# Patient Record
Sex: MALE | Race: WHITE
[De-identification: names, ages, dates, MRNs, and addresses within clinical notes are randomized per-mention and may not be internally consistent; named-entity substitution may affect disease eponyms.]

---

## 2017-06-19 ENCOUNTER — HOSPITAL ENCOUNTER (OUTPATIENT)
Dept: HOSPITAL 47 - LABWHC1 | Age: 77
Discharge: HOME | End: 2017-06-19
Attending: INTERNAL MEDICINE
Payer: MEDICARE

## 2017-06-19 DIAGNOSIS — E11.9: ICD-10-CM

## 2017-06-19 DIAGNOSIS — I10: ICD-10-CM

## 2017-06-19 DIAGNOSIS — E78.00: Primary | ICD-10-CM

## 2017-06-19 LAB
ANION GAP SERPL CALC-SCNC: 13 MMOL/L
AST SERPL-CCNC: 29 U/L (ref 17–59)
BUN SERPL-SCNC: 29 MG/DL (ref 9–20)
CH: 30.4
CHCM: 33.3
CHLORIDE SERPL-SCNC: 102 MMOL/L (ref 98–107)
CHOLEST SERPL-MCNC: 130 MG/DL (ref ?–200)
CO2 SERPL-SCNC: 25 MMOL/L (ref 22–30)
ERYTHROCYTE [DISTWIDTH] IN BLOOD BY AUTOMATED COUNT: 4.77 M/UL (ref 4.3–5.9)
ERYTHROCYTE [DISTWIDTH] IN BLOOD: 13.6 % (ref 11.5–15.5)
GLUCOSE SERPL-MCNC: 108 MG/DL (ref 74–99)
HCT VFR BLD AUTO: 43.8 % (ref 39–53)
HDLC SERPL-MCNC: 35 MG/DL (ref 40–60)
HDW: 2.25
HEMOGLOBIN A1C: 5.9 % (ref 4.2–6.1)
HGB BLD-MCNC: 14.5 GM/DL (ref 13–17.5)
MCH RBC QN AUTO: 30.4 PG (ref 25–35)
MCHC RBC AUTO-ENTMCNC: 33.2 G/DL (ref 31–37)
MCV RBC AUTO: 91.7 FL (ref 80–100)
NON-AFRICAN AMERICAN GFR(MDRD): >60
POTASSIUM SERPL-SCNC: 4.3 MMOL/L (ref 3.5–5.1)
SODIUM SERPL-SCNC: 140 MMOL/L (ref 137–145)
TRIGL SERPL-MCNC: 74 MG/DL (ref ?–150)
WBC # BLD AUTO: 5.3 K/UL (ref 3.8–10.6)

## 2017-06-19 PROCEDURE — 80061 LIPID PANEL: CPT

## 2017-06-19 PROCEDURE — 83036 HEMOGLOBIN GLYCOSYLATED A1C: CPT

## 2017-06-19 PROCEDURE — 80051 ELECTROLYTE PANEL: CPT

## 2017-06-19 PROCEDURE — 82565 ASSAY OF CREATININE: CPT

## 2017-06-19 PROCEDURE — 82947 ASSAY GLUCOSE BLOOD QUANT: CPT

## 2017-06-19 PROCEDURE — 84520 ASSAY OF UREA NITROGEN: CPT

## 2017-06-19 PROCEDURE — 85027 COMPLETE CBC AUTOMATED: CPT

## 2017-06-19 PROCEDURE — 84450 TRANSFERASE (AST) (SGOT): CPT

## 2017-06-19 PROCEDURE — 36415 COLL VENOUS BLD VENIPUNCTURE: CPT

## 2017-07-14 ENCOUNTER — HOSPITAL ENCOUNTER (OUTPATIENT)
Dept: HOSPITAL 47 - RADCTMAIN | Age: 77
Discharge: HOME | End: 2017-07-14
Payer: MEDICARE

## 2017-07-14 DIAGNOSIS — C91.40: Primary | ICD-10-CM

## 2017-07-14 DIAGNOSIS — Z98.890: ICD-10-CM

## 2017-07-14 DIAGNOSIS — Z88.5: ICD-10-CM

## 2017-07-14 LAB
BUN SERPL-SCNC: 25 MG/DL (ref 9–20)
NON-AFRICAN AMERICAN GFR(MDRD): >60

## 2017-07-14 PROCEDURE — 71260 CT THORAX DX C+: CPT

## 2017-07-14 PROCEDURE — 82565 ASSAY OF CREATININE: CPT

## 2017-07-14 PROCEDURE — 36415 COLL VENOUS BLD VENIPUNCTURE: CPT

## 2017-07-14 PROCEDURE — 84520 ASSAY OF UREA NITROGEN: CPT

## 2017-07-14 PROCEDURE — 70491 CT SOFT TISSUE NECK W/DYE: CPT

## 2017-07-17 NOTE — CT
EXAMINATION TYPE: CT neck chest w con

 

DATE OF EXAM: 7/14/2017

 

COMPARISON: CT neck June 2, 2015. CT chest June 12, 2012. PET CT October 13, 2012.

 

HISTORY: History of skin cancer and hairy cell leukemia.

 

CT DLP: 1102.00 mGycm.   Automated Exposure Control for Dose Reduction was Utilized.

 

 

TECHNIQUE:  CT scan of the neck and thorax are performed following with IV Contrast, patient injected
 with 100 mL of Omnipaque 300.

 

FINDINGS:

 

NECK:

 

 

Airway: There is heterogeneous multinodular thyroid redemonstrated, there appears to be interval more
 numerous and prominent nodules, for reference 1 cm nodule is seen on coronal image 57. Further inves
tigation with ultrasound correlation is advised. Majority of left thyroid is surgically absent simila
r prior.

 

Parotid/submandibular glands: Left-sided carotid and submandibular glands are surgically absent.

 

Carotid/Vascular Structures: Surrounding soft tissue along course of the left common carotid artery i
ncluding bulb and proximal internal carotid artery likely reflects product of treatment change.

 

Osseous Structures: There is prominent multilevel spurring in the cervical thoracic spine. Multilevel
 moderate disc space narrowing is seen most prominent C5-C6 and T2-T3 levels. 

 

Other: Majority of left neck is surgically resected as detailed above including left-sided muscles fr
om parotid level posterior to the mandible down to the level of thyroid.

 

No new suspicious greater than 1 cm lymph nodes are seen.

 

CHEST:

 

LUNGS: Left apical scarring is redemonstrated at site of surgical suture. There is additional more pa
tchy scarring in the right middle lobe near diaphragm. There is elevated right hemidiaphragm redemons
trated. No suspicious nodules or masses are present. No pleural effusion or pneumothorax is seen. Tra
cheobronchial tree is patent.

 

MEDIASTINUM: There are no greater than 1 cm hilar or mediastinal lymph nodes.   No cardiomegaly or pe
ricardial effusion is seen.  Coronary artery calcification is present.

 

OTHER: Cholecystectomy clips are seen. Multilevel spurring in the spine is present.

 

IMPRESSION: Long segment postsurgical changes left neck with smaller changes left lung apex. No recur
rent mass or adenopathy is seen to suggest neoplastic recurrence.

## 2018-08-01 ENCOUNTER — HOSPITAL ENCOUNTER (OUTPATIENT)
Dept: HOSPITAL 47 - RADECHMAIN | Age: 78
Discharge: HOME | End: 2018-08-01
Attending: INTERNAL MEDICINE
Payer: MEDICARE

## 2018-08-01 DIAGNOSIS — I49.3: Primary | ICD-10-CM

## 2018-08-01 DIAGNOSIS — I47.1: ICD-10-CM

## 2018-08-01 PROCEDURE — 93225 XTRNL ECG REC<48 HRS REC: CPT

## 2018-08-01 PROCEDURE — 93226 XTRNL ECG REC<48 HR SCAN A/R: CPT

## 2018-08-07 NOTE — HM
HOLTER MONITOR REPORT



INDICATION:

Chest discomfort.



REFERRING PHYSICIAN:

Dr. Aleman.



CLINICAL INFORMATION:

The patient was monitored for 24 hours.  The baseline rhythm appeared to be a sinus

mechanism with a minimum heart rate of 51 beats per minute, max heart rate 112 beats

per minute and average heart rate of 68 beats per minute.  Ventricular ectopic events

were represented in less than 1% of the total beats count and presented as PVC,

bigeminy, triplet and couplet as well.  Supraventricular ectopic events were present in

less than 1% of the total beats count and presented as couplets and triplets as well as

PACs.  The patient did have multiple episodes of paroxysmal atrial tachycardia noted.

No evidence of any sinus pause or sinus arrest.  No evidence of any advanced AV block

seen.  The patient reported symptoms of fluttering in the chest and the symptoms were

associated with normal sinus mechanism as well as with PVCs.



CONCLUSION:

1. Sinus rhythm as a baseline mechanism.

2. Prior ventricular ectopic events.

3. Rare supraventricular ectopic events.

4. The patient did have multiple episodes of paroxysmal atrial tachycardia noted.

5. No evidence of any sinus pause or sinus arrest.

6. There is no evidence of any advanced atrioventricular block.

7. The patient reports symptoms of palpitations in the chest and fluttering in the

    chest and the symptoms were associated with normal sinus mechanism and premature

    ventricular contraction.





MMODL / IJN: [id number] / Job#: [id number]

## 2018-08-13 ENCOUNTER — HOSPITAL ENCOUNTER (OUTPATIENT)
Dept: HOSPITAL 47 - ORWHC2ENDO | Age: 78
Discharge: HOME | End: 2018-08-13
Payer: MEDICARE

## 2018-08-13 VITALS — SYSTOLIC BLOOD PRESSURE: 155 MMHG | RESPIRATION RATE: 18 BRPM | HEART RATE: 68 BPM | DIASTOLIC BLOOD PRESSURE: 73 MMHG

## 2018-08-13 VITALS — BODY MASS INDEX: 54.9 KG/M2

## 2018-08-13 VITALS — TEMPERATURE: 98.6 F

## 2018-08-13 DIAGNOSIS — Z79.84: ICD-10-CM

## 2018-08-13 DIAGNOSIS — E78.5: ICD-10-CM

## 2018-08-13 DIAGNOSIS — K64.8: ICD-10-CM

## 2018-08-13 DIAGNOSIS — K21.9: ICD-10-CM

## 2018-08-13 DIAGNOSIS — Z86.73: ICD-10-CM

## 2018-08-13 DIAGNOSIS — Z79.899: ICD-10-CM

## 2018-08-13 DIAGNOSIS — Z12.11: Primary | ICD-10-CM

## 2018-08-13 DIAGNOSIS — E11.9: ICD-10-CM

## 2018-08-13 DIAGNOSIS — G45.3: ICD-10-CM

## 2018-08-13 DIAGNOSIS — K57.30: ICD-10-CM

## 2018-08-13 DIAGNOSIS — Z87.891: ICD-10-CM

## 2018-08-13 DIAGNOSIS — Z88.5: ICD-10-CM

## 2018-08-13 DIAGNOSIS — Z86.010: ICD-10-CM

## 2018-08-13 DIAGNOSIS — Z79.02: ICD-10-CM

## 2018-08-13 DIAGNOSIS — I10: ICD-10-CM

## 2018-08-13 LAB — GLUCOSE BLD-MCNC: 88 MG/DL (ref 75–99)

## 2018-08-13 NOTE — P.PCN
Date of Procedure: 08/13/18


Procedure(s) Performed: 


Procedure: Total colonoscopy.





Preoperative diagnosis: Screening for neoplasia.





Postoperative diagnosis: Sigmoid diverticulosis with no evidence of acute 

diverticulitis, strictures, polyps or cancer.





Preparation: HalfLytely prep.





Sedation: Was provided by anesthesia.





Brief clinical history: The patient is a 78-year-old male who is scheduled for 

this evaluation for screening for neoplasia.  The patient had a prior exam more 

than 5 years ago and he indicated history of polyps.  He has no abdominal 

complaints, bleeding or anemia.





Procedure: With the patient on his left lateral decubitus position and after 

informed consent and adequate sedation, the perianal area was inspected and it 

did not show any fissures or fistulas.  There were no masses felt on digital 

rectal examination.  The Olympus CFQ 160L video colonoscope was then inserted 

in the rectum in the usual fashion and advanced to the cecum.  There were 

multiple diverticular orifices seen scattered in the sigmoid but I saw no 

evidence of acute diverticulitis or strictures.  No polyps or tumors were seen.

  I retroflexed the endoscope in the rectum before the endoscope was withdrawn.

  Low-grade internal hemorrhoids were noted but there was no evidence of 

bleeding.





The patient tolerated the procedure well.





Plan: The patient was reassured.  Discussed dietary measures.  He will follow 

up with you as planned.  Further plans can be made based on his course.

## 2020-06-05 ENCOUNTER — HOSPITAL ENCOUNTER (OUTPATIENT)
Dept: HOSPITAL 47 - LABWHC1 | Age: 80
Discharge: HOME | End: 2020-06-05
Attending: INTERNAL MEDICINE
Payer: MEDICARE

## 2020-06-05 DIAGNOSIS — I10: Primary | ICD-10-CM

## 2020-06-05 DIAGNOSIS — E11.9: ICD-10-CM

## 2020-06-05 DIAGNOSIS — E78.3: ICD-10-CM

## 2020-06-05 LAB
ALBUMIN SERPL-MCNC: 4.4 G/DL (ref 3.8–4.9)
ALBUMIN/GLOB SERPL: 2.59 G/DL (ref 1.6–3.17)
ALP SERPL-CCNC: 107 U/L (ref 41–126)
ALT SERPL-CCNC: 24 U/L (ref 10–49)
ANION GAP SERPL CALC-SCNC: 7.8 MMOL/L (ref 4–12)
AST SERPL-CCNC: 22 U/L (ref 14–35)
BASOPHILS # BLD AUTO: 0 K/UL (ref 0–0.2)
BASOPHILS NFR BLD AUTO: 0 %
BUN SERPL-SCNC: 28 MG/DL (ref 9–27)
BUN/CREAT SERPL: 28 RATIO (ref 12–20)
CALCIUM SPEC-MCNC: 9.1 MG/DL (ref 8.7–10.3)
CHLORIDE SERPL-SCNC: 103 MMOL/L (ref 96–109)
CHOLEST SERPL-MCNC: 126 MG/DL (ref 0–200)
CO2 SERPL-SCNC: 28.2 MMOL/L (ref 21.6–31.8)
EOSINOPHIL # BLD AUTO: 0.2 K/UL (ref 0–0.7)
EOSINOPHIL NFR BLD AUTO: 3 %
ERYTHROCYTE [DISTWIDTH] IN BLOOD BY AUTOMATED COUNT: 4.53 M/UL (ref 4.3–5.9)
ERYTHROCYTE [DISTWIDTH] IN BLOOD: 12.9 % (ref 11.5–15.5)
GLOBULIN SER CALC-MCNC: 1.7 G/DL (ref 1.6–3.3)
GLUCOSE SERPL-MCNC: 109 MG/DL (ref 70–110)
HBA1C MFR BLD: 6.4 % (ref 4–6)
HCT VFR BLD AUTO: 41.1 % (ref 39–53)
HDLC SERPL-MCNC: 31 MG/DL (ref 40–60)
HGB BLD-MCNC: 13.6 GM/DL (ref 13–17.5)
LDLC SERPL CALC-MCNC: 77.6 MG/DL (ref 0–131)
LYMPHOCYTES # SPEC AUTO: 0.9 K/UL (ref 1–4.8)
LYMPHOCYTES NFR SPEC AUTO: 15 %
MCH RBC QN AUTO: 29.9 PG (ref 25–35)
MCHC RBC AUTO-ENTMCNC: 33 G/DL (ref 31–37)
MCV RBC AUTO: 90.8 FL (ref 80–100)
MONOCYTES # BLD AUTO: 0.4 K/UL (ref 0–1)
MONOCYTES NFR BLD AUTO: 6 %
NEUTROPHILS # BLD AUTO: 4.5 K/UL (ref 1.3–7.7)
NEUTROPHILS NFR BLD AUTO: 74 %
PLATELET # BLD AUTO: 141 K/UL (ref 150–450)
POTASSIUM SERPL-SCNC: 4.6 MMOL/L (ref 3.5–5.5)
PROT SERPL-MCNC: 6.1 G/DL (ref 6.2–8.2)
SODIUM SERPL-SCNC: 139 MMOL/L (ref 135–145)
TRIGL SERPL-MCNC: 87 MG/DL (ref 0–149)
VLDLC SERPL CALC-MCNC: 17.4 MG/DL (ref 5–40)
WBC # BLD AUTO: 6.1 K/UL (ref 3.8–10.6)

## 2020-06-05 PROCEDURE — 84443 ASSAY THYROID STIM HORMONE: CPT

## 2020-06-05 PROCEDURE — 85025 COMPLETE CBC W/AUTO DIFF WBC: CPT

## 2020-06-05 PROCEDURE — 80061 LIPID PANEL: CPT

## 2020-06-05 PROCEDURE — 83036 HEMOGLOBIN GLYCOSYLATED A1C: CPT

## 2020-06-05 PROCEDURE — 80053 COMPREHEN METABOLIC PANEL: CPT

## 2020-06-05 PROCEDURE — 36415 COLL VENOUS BLD VENIPUNCTURE: CPT

## 2024-09-05 NOTE — XR
EXAM TYPE: LUMBAR SPINE X RAY SERIES

 

COMPARISON: NONE

 

HISTORY: Pain

 

TECHNIQUE: 3 views are submitted.

 

FINDINGS:

Multilevel hypertrophic and degenerative changes spine with multilevel facet arthropathy. Vascular ca
lcifications. Generalized demineralization. Sclerotic density right iliac bone likely related to bone
 island. Surgical clips incidentally noted. Nonspecific sclerosis L2 vertebral body.

 

IMPRESSION:

1. Multilevel degenerative disc disease most marked at L5-S1. Severe facet arthropathy L3-S1 foramina
l encroachment L4-5 and L5-S1 suspected.

2. Nonspecific sclerosis involving the L2 vertebral body. Correlate with PSA.

## 2024-09-05 NOTE — XR
EXAMINATION TYPE: XR Hip RT and AP Pelvis

 

DATE OF EXAM: 9/5/2024

 

COMPARISON: NONE

 

HISTORY: A

 

TECHNIQUE: A single AP view of the pelvis is obtained. Two views of the right hip are obtained.  

 

FINDINGS: Degenerative changes in the spine. Vascular calcifications. Mild generalized demineralizati
on. Nonspecific sclerotic lesion right iliac bone possibly bone island. Moderate hypertrophic bilater
al hip arthropathy. Correlate for femoral acetabular impingement. No definite acute fracture or dislo
cation. SI joint arthropathy.

 

IMPRESSION:

1. 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

2. No definite acute fracture. If there is difficulty with weightbearing and history of trauma then c
onsider CT scan.

## 2024-09-05 NOTE — ED
Back Pain HPI





- General


Chief Complaint: Back Pain/Injury


Stated Complaint: R hip pain


Time Seen by Provider: 09/05/24 08:30


Source: patient, RN notes reviewed


Limitations: no limitations





- History of Present Illness


Initial Comments: 


84-year-old male presents to the emergency department with lower back pain 

radiating to right hip and leg for 1 week, previous history of lumbar spinal 

surgery, denies associated symptoms.  Patient states that he has pain in his low

back to right hip denies any bowel, bladder and cons retention no saddle 

anesthesias no abdominal complaints.  He was seen in urgent care over a week 

ago.  He states that the burning type pain.  He is only taken acetaminophen for 

this pain.  Reports no ameliorating factors, walking aggravates pain.


MD Complaint: back pain


-: week(s)


Similar Symptoms Previously: Yes


Place: home


Radiation: right leg


Severity: severe


Severity scale (1-10): 10


Quality: burning


Consistency: constant


Improves With: none


Worsens With: walking


Context: unknown


Associated Symptoms: denies other symptoms


Treatments Prior to Arrival: NSAIDS, acetaminophen





- Related Data


                                Home Medications











 Medication  Instructions  Recorded  Confirmed


 


Aleve Arthritis 1 each PO BID 08/08/18 08/13/18


 


Clopidogrel [Plavix] 75 mg PO DAILY 08/08/18 08/13/18


 


Focus Eye Tabs 1 each PO DAILY 08/08/18 08/13/18


 


Omeprazole 20 mg PO DAILY 08/08/18 08/13/18


 


Pravastatin Sodium [Pravachol] 40 mg PO HS 08/08/18 08/13/18


 


amLODIPine BESYLATE/BENAZEPRIL 1 each PO DAILY 08/08/18 08/13/18





[amLODIPine BESYLATE/BENAZEPRIL   





5-10 mg]   


 


metFORMIN HCL [Glucophage] 500 mg PO TID 08/08/18 08/13/18








                                  Previous Rx's











 Medication  Instructions  Recorded


 


Acetaminophen-Codeine 300-30mg 1 tab PO Q4H PRN #12 tablet 09/05/24





[Tylenol #3]  


 


predniSONE 50 mg PO DAILY #5 tab 09/05/24











                                    Allergies











Allergy/AdvReac Type Severity Reaction Status Date / Time


 


fentanyl Allergy  Itching Verified 09/05/24 08:27





   AND HIVES  














Review of Systems


ROS Statement: 


Those systems with pertinent positive or pertinent negative responses have been 

documented in the HPI.





ROS Other: All systems not noted in ROS Statement are negative.


Musculoskeletal: Reports: back pain, arthralgia





Past Medical History


Past Medical History: Cancer, CVA/TIA, Diabetes Mellitus, GERD/Reflux, Hearing 

Disorder / Deafness, Hyperlipidemia, Hypertension


Additional Past Medical History / Comment(s): STROKE BEHIND EYES PER PT-WAS 

BLIND FOR A PERIOD OF TIME. VISION OK AT THIS TIME.  SKIN CANCER


History of Any Multi-Drug Resistant Organisms: None Reported


Past Surgical History: Cholecystectomy, Orthopedic Surgery


Additional Past Surgical History / Comment(s): BONE SPURS REMOVED BILAT 

SHOULDERS.  COLONOSCOPY.  BILAT CATARACTS.  30 LYMPH NODES REMOVED FROM LEFT 

SIDE OF BODY, LT SIDED SALIVARY GLANDS, LT JUGULAR VEIN AND NERVE ENDINGS TO 

LEFT SIDE OF FACE REMOVED R/T CANCER


Past Anesthesia/Blood Transfusion Reactions: No Reported Reaction


Past Psychological History: No Psychological Hx Reported


Past Alcohol Use History: None Reported


Past Drug Use History: None Reported





- Past Family History


  ** Brother(s)


Family Medical History: Cancer


Additional Family Medical History / Comment(s): COLON





General Exam


Limitations: no limitations


General appearance: alert, in no apparent distress


Head exam: Present: atraumatic, normocephalic, normal inspection


Eye exam: Present: normal appearance, PERRL, EOMI.  Absent: scleral icterus, 

conjunctival injection, periorbital swelling


ENT exam: Present: normal exam, mucous membranes moist


Neck exam: Present: other.  Absent: normal inspection (Chronic Deformity), 

tenderness, meningismus, lymphadenopathy


Respiratory exam: Present: normal lung sounds bilaterally.  Absent: respiratory 

distress, wheezes, rales, rhonchi, stridor


Cardiovascular Exam: Present: regular rate, normal rhythm, normal heart sounds. 

Absent: systolic murmur, diastolic murmur, rubs, gallop, clicks


GI/Abdominal exam: Present: soft, normal bowel sounds.  Absent: distended, 

tenderness, guarding, rebound, rigid


 exam: Present: normal inspection


Extremities exam: Present: full ROM, tenderness


  ** Right


Hip exam: Present: normal inspection, full ROM, tenderness


Upper Leg exam: Present: normal inspection, full ROM, tenderness


Back exam: Present: full ROM, tenderness, vertebral tenderness


Neurological exam: Present: alert, oriented X3, CN II-XII intact





Course


                                   Vital Signs











  09/05/24 09/05/24





  08:24 09:38


 


Temperature 97.7 F 98.1 F


 


Pulse Rate 55 L 54 L


 


Respiratory 16 18





Rate  


 


Blood Pressure 197/73 180/74


 


O2 Sat by Pulse 99 97





Oximetry  














Medical Decision Making





- Medical Decision Making


Was pt. sent in by a medical professional or institution (NATHALY Calvo, NP, urgent 

care, hospital, or nursing home...) When possible be specific


@ -No


Did you speak to anyone other than the patient for history (EMS, parent, family,

police, friend...)? What history was obtained from this source 


@ -No


Did you review nursing and triage notes (agree or disagree)? Why? 


@ -I reviewed and agree with nursing and triage notes


Were old charts reviewed (outside hosp., previous admission, EMS record, old 

EKG, old radiological studies, urgent care reports/EKG's, nursing home records)?

Report findings 


@ -No old charts were reviewed


Differential Diagnosis (chest pain, altered mental status, abdominal pain women,

abdominal pain men, vaginal bleeding, weakness, fever, dyspnea, syncope, 

headache, dizziness, GI bleed, back pain, seizure, CVA, palpatations, mental 

health, musculoskeletal)? 


@ -Differential Back Pain:


Strain, zoster, cauda equina syndrome, epidural abscess, vertebral 

osteomyelitis, discitis, fracture, subluxation, disc herniation, DJD, spinal 

stenosis, dissection, AAA, pancreatitis, peptic ulcer disease, pyelonephritis, 

kidney stone, this is not meant to be an all-inclusive list.





EKG interpreted by me (3pts min.).


@ -None


X-rays interpreted by me (1pt min.).


@ -@.  Lumbar spine showing degenerative changes lower lumbar no osseous 

abnormality otherwise


X-ray right hip and AP pelvis showing acetabular impingement syndrome, 

degenerative changes


CT interpreted by me (1pt min.).


@ -None done


U/S interpreted by me (1pt. min.).


@ -None done


What testing was considered but not performed or refused? (CT, X-rays, U/S, 

labs)? Why?


@ -None


What meds were considered but not given or refused? Why?


@ -None


Did you discuss the management of the patient with other professionals 

(professionals i.e. NATHALY Calvo, NP, lab, RT, psych nurse, , , 

teacher, , )? Give summary


@ -No


Was smoking cessation discussed for >3mins.?


@ -No


Was critical care preformed (if so, how long)?


@ -No


Were there social determinants of health that impacted care today? How? 

(Homelessness, low income, unemployed, alcoholism, drug addiction, 

transportation, low edu. Level, literacy, decrease access to med. care, long term, 

rehab)?


@ -No


Was there de-escalation of care discussed even if they declined (Discuss DNR or 

withdrawal of care, Hospice)? DNR status


@ -No


What co-morbidities impacted this encounter? (DM, HTN, Smoking, COPD, CAD, 

Cancer, CVA, ARF, Chemo, Hep., AIDS, mental health diagnosis, sleep apnea, 

morbid obesity)?


@ -None


Was patient admitted / discharged? Hospital course, mention meds given and 

route, prescriptions, significant lab abnormalities, going to OR and other 

pertinent info.


@ -Discharge patient presented for hip pain, low back pain patient has no red 

flag symptoms.  He has degenerative changes noted on x-ray he will follow-up 

with orthopedics.  Patient provided analgesics, steroids.  Return transfer 

discussed.


Undiagnosed new problem with uncertain prognosis?


@ -No


Drug Therapy requiring intensive monitoring for toxicity (Heparin, Nitro, 

Insulin, Cardizem)?


@ -No


Were any procedures done?


@ -No


Diagnosis/symptom?


@ -Lumbar radiculopathy, right hip pain


Acute, or Chronic, or Acute on Chronic?


@ -Acute


Uncomplicated (without systemic symptoms) or Complicated (systemic symptoms)?


@ -Uncomplicated


Side effects of treatment?


@ -No


Exacerbation, Progression, or Severe Exacerbation?


@ -No


Poses a threat to life or bodily function? How? (Chest pain, USA, MI, pneumonia,

PE, COPD, DKA, ARF, appy, cholecystitis, CVA, Diverticulitis, Homicidal, 

Suicidal, threat to staff... and all critical care pts)


@ -No








Disposition


Clinical Impression: 


 Lumbar radiculopathy, acute, Right hip pain





Disposition: HOME SELF-CARE


Condition: Stable


Instructions (If sedation given, give patient instructions):  Lumbar 

Radiculopathy (ED)


Additional Instructions: 


Please return to the Emergency Department if symptoms worsen or any other 

concerns.


Prescriptions: 


predniSONE 50 mg PO DAILY #5 tab


Acetaminophen-Codeine 300-30mg [Tylenol #3] 1 tab PO Q4H PRN #12 tablet


 PRN Reason: pain


Is patient prescribed a controlled substance at d/c from ED?: Yes


When asked, does pt state using other controlled substances?: No


If prescribed controlled substance>3 days was MAPS reviewed?: Prescribed <3 Days


If opioid is for acute pain is fill amount 7 days or less?: Yes


If Rx opioid, was Start Talking consent form obtained?: Yes


Referrals: 


Enoc Weaver MD [Primary Care Provider] - 1-2 days


RORO Mckinley DO [Doctor of Osteopathic Medicine] - 1-2 days


Time of Disposition: 10:21

## 2024-09-20 NOTE — US
EXAMINATION TYPE: US carotid duplex BILAT

 

DATE OF EXAM: 9/20/2024

 

COMPARISON: NONE

 

CLINICAL INDICATION: Male, 84 years old with history of I65.23 CAROTID STENOSIS; stenosis

 

TECHNIQUE: Carotid duplex ultrasound examination. Indirect Doppler criteria was utilized.

 

FINDINGS:

 

EXAM MEASUREMENTS: 

 

RIGHT:  Peak Systolic Velocity (PSV) cm/sec

----- Right CCA:  108  

----- Right ICA:  142     

----- Right ECA:  77.2   

ICA/CCA ratio:  1.3    

 

RIGHT:  End Diastole cm/sec

----- Right CCA:  16   

----- Right ICA:  21.7      

----- Right ECA:  1.5     

 

LEFT:  Peak Systolic Velocity (PSV) cm/sec

----- Left CCA:  88.6  

----- Left ICA:  82.8   

----- Left ECA:  63.8  

ICA/CCA ratio:  0.9  

 

LEFT:  End Diastole cm/sec

----- Left CCA:  17.4  

----- Left ICA:  14.9   

----- Left ECA:  6.6 

 

VERTEBRALS (direction of flow):

Right Vertebral: Antegrade

Left Vertebral: Antegrade

 

Rhythm:  Normal

 

SONOGRAPHER NOTES: exam limited due to tortuous vessels and very high bifurcation with shadowing from
 mandible, significant soft plaque noted throughout. Proximal ICA has an elevated velocity of 142 tho
ugh does not display a stenotic waveform

 

 

 

IMPRESSION:  

Atheromatous plaquing bilateral carotid by patient's. This is moderate stenosis between 50 and 69%, r
ight internal carotid artery.   

 

 

 

 

 

 

Criteria for Assigning % of Stenosis / Diameter reduction

(Estimation based on the indirect measurements of the internal carotid artery velocities (ICA PSV).

1.  Normal (no stenosis)=ICA PSV < 125 cm/s: ratio < 2.0: ICA EDV<40 cm/s.

2. Less than 50% stenosis=ICA PSV < 125 cm/s: ratio < 2.0: ICA EDV<40 cm/s.

3.  50 to 69% stenosis=ICA PSV of 125 to 230 cm/s: ration 2.0 ? 4.0: ICA EDV  cm/s.

4.  Greater than 70% stenosis to near occlusion= ICA PSV > 230 cm/s: ratio > 4.0: ICA EDV > 100 cm/s.
 

5.  Near occlusion= ICA PSV velocities may be low or undetectable: variable ratio and ICA EDV.

6.  Total occlusion=unable to detect flow.

 

 

 

 

 

[facility], Workstation: [id number], 9/20/2024 3:51 PM

## 2024-09-30 NOTE — CT
EXAMINATION TYPE: 

CT noncontrast brain.

CT angiogram brain.

CT angiogram neck.

 

CT DLP: 1326.7 mGycm, Automated exposure control for dose reduction was used.

 

DATE OF EXAM: 9/30/2024 4:47 PM

 

COMPARISON: 7/14/2017.  

 

CLINICAL INDICATION: Male, 84 years old with history of I65.23 OCCLUSION AND STENOSIS OF BILATERAL CA
ROTID; PHH, Carotid stenosis.

 

TECHNIQUE: 

Noncontrast CT brain was performed followed by CT angiogram head and neck. Axially acquired helical C
T angiogram of the head and neck was obtained with contrast. Axial images are supplemented with 3D re
constructions  and MIP images which were post-processed at an independent workstation. NASCET criteri
a used.

Contrast used:65 ml mL of Isovue 370 with IV Contrast, 

Oral contrast used: None. 

 

FINDINGS:

Brain:

Extra-axial spaces: No abnormal extra-axial fluid collections.

Ventricular system: Dilatation in proportion to cerebral atrophy.

Cerebral parenchyma: Cerebral atrophy. No acute intraparenchymal hemorrhage or mass effect.  The gray
-white junction is well differentiated. Scattered hypoattenuating areas are seen within the white mat
ter.  

Cerebellum: Unremarkable.

Mass effect: No evidence of midline shift.

Intracranial vasculature: Atherosclerotic calcifications of the intracranial vessels.

Soft tissues: Normal.

Calvarium/osseous structures: No depressed skull fracture.

Paranasal sinuses and mastoid air cells: Mild scattered paranasal sinus disease.

Visualized orbits: Bilateral aphakia

 

CTA HEAD:

No evidence of acute intracranial hemorrhage, mass effect, or midline shift. The ventricles, sulci, a
nd cisterns are unremarkable. 

 

The visualized portions of the internal carotid arteries, middle cerebral arteries, anterior cerebral
 arteries, and posterior cerebral arteries are patent. 

 

The basilar and vertebral arteries are patent. 

 

CTA NECK:

Right Carotid System: The common carotid and external carotid arteries are patent. There is less than
 25% stenosis at the carotid bifurcation secondary to calcified/noncalcified plaque. The rest of the 
internal carotid artery is patent.

 

 

Left Carotid System: 

The common carotid and external carotid arteries are patent. The carotid bifurcation is patent. There
 is approximately 40% stenosis of the superior portion of the left internal carotid artery bifurcatio
n series 3 image 35

 

Vertebral arteries are patent without evidence hemodynamically significant stenosis.

 

There is a three-vessel aortic arch. The origins of the great vessels are patent. No evidence of hemo
dynamically significant stenosis.

 

Upper thorax: Apical scarring/consolidation changes in the left lung apex. Right thyroid gland nodule
s measured to 15 mm.

 

IMPRESSION: 

1.  40% stenosis of the left internal carotid artery past its bifurcation. No significant stenosis of
 the right carotid system. New Linear filling defects possibly secondary to atherosclerotic plaque ve
rsus carotid web 

2.  No evidence of intracranial high-grade stenosis or intracranial aneurysm. 

3. No acute intracranial process.

 

 

[facility], Workstation: [id number], 9/30/2024 5:21 PM